# Patient Record
Sex: FEMALE | URBAN - NONMETROPOLITAN AREA
[De-identification: names, ages, dates, MRNs, and addresses within clinical notes are randomized per-mention and may not be internally consistent; named-entity substitution may affect disease eponyms.]

---

## 2021-03-18 ENCOUNTER — OFFICE VISIT (OUTPATIENT)
Dept: URBAN - NONMETROPOLITAN AREA CLINIC 5 | Facility: CLINIC | Age: 71
End: 2021-03-18
Payer: MEDICARE

## 2021-03-18 DIAGNOSIS — H35.373 PUCKERING OF MACULA, BILATERAL: Primary | ICD-10-CM

## 2021-03-18 DIAGNOSIS — H43.813 VITREOUS DEGENERATION, BILATERAL: ICD-10-CM

## 2021-03-18 DIAGNOSIS — H25.13 AGE-RELATED NUCLEAR CATARACT, BILATERAL: ICD-10-CM

## 2021-03-18 PROCEDURE — 92134 CPTRZ OPH DX IMG PST SGM RTA: CPT | Performed by: OPHTHALMOLOGY

## 2021-03-18 PROCEDURE — 99204 OFFICE O/P NEW MOD 45 MIN: CPT | Performed by: OPHTHALMOLOGY

## 2021-03-18 ASSESSMENT — INTRAOCULAR PRESSURE
OS: 8
OD: 11

## 2021-03-18 NOTE — IMPRESSION/PLAN
Impression: Puckering of macula, bilateral: H35.373. OCT OU= mild ERM OD, dense ERM OS  / 472 Plan: There is an epiretinal membrane (ERM) that appears significant. We discussed the natural history as well as the risk and benefits of observation versus vitrectomy with membrane peeling. The patient understands that with vitrectomy, the vision can improve, but does not improve fully and sometimes the vision does not improve at all. Also, it can take months to years for the vision to improve. The risks of vitrectomy include but are not limited to infection, retinal tear or detachment, glaucoma, cataract formation in a phakic patient, hemorrhage, loss of eye and blindness, recurrent epiretinal membranes, need for additional surgery, and chronic cystoid macular edema. The patient understands that some important surgical tasks may be performed by a fellow under my direct supervision, and consents to such. The patient elects to obs for now. 

PLAN: 3m OCT OU x ERM OU

## 2021-03-18 NOTE — IMPRESSION/PLAN
Impression: Age-related nuclear cataract, bilateral: H25.13. Plan: Patient aware that vitrectomy will cause increased cataract growth and likely need for CE/IOL.

## 2023-04-06 ENCOUNTER — OFFICE VISIT (OUTPATIENT)
Dept: URBAN - NONMETROPOLITAN AREA CLINIC 5 | Facility: CLINIC | Age: 73
End: 2023-04-06
Payer: MEDICARE

## 2023-04-06 DIAGNOSIS — H43.813 VITREOUS DEGENERATION, BILATERAL: ICD-10-CM

## 2023-04-06 DIAGNOSIS — H35.373 PUCKERING OF MACULA, BILATERAL: Primary | ICD-10-CM

## 2023-04-06 DIAGNOSIS — H25.13 AGE-RELATED NUCLEAR CATARACT, BILATERAL: ICD-10-CM

## 2023-04-06 PROCEDURE — 99204 OFFICE O/P NEW MOD 45 MIN: CPT | Performed by: OPHTHALMOLOGY

## 2023-04-06 PROCEDURE — 92134 CPTRZ OPH DX IMG PST SGM RTA: CPT | Performed by: OPHTHALMOLOGY

## 2023-04-06 ASSESSMENT — INTRAOCULAR PRESSURE
OS: 13
OD: 16

## 2023-04-06 NOTE — IMPRESSION/PLAN
Impression: Puckering of macula, bilateral: H35.373. OCT OU - no IRF/SRF OU, ERM OS>OD  / 474 Plan: OS>OD There is an epiretinal membrane (ERM). We discussed the natural history as well as the risk and benefits of observation versus vitrectomy with membrane peeling. The patient understands that with vitrectomy, the vision can improve, but does not improve fully and sometimes the vision does not improve at all. Also, it can take months to years for the vision to improve. The risks of vitrectomy include but are not limited to infection, retinal tear or detachment, glaucoma, cataract formation in a phakic patient, hemorrhage, loss of eye and blindness, recurrent epiretinal membranes, need for additional surgery, and chronic cystoid macular edema. The patient elects to proceed with vitrectomy surgery. PLAN: PPV/MP/ILM peel/ICG/IVK x ERM OS Patient [place]. NO GAS for surgery. D/w patient the possible need for SO and then staged SO removal in the future. Patient voices understanding.

## 2023-05-18 ENCOUNTER — POST-OPERATIVE VISIT (OUTPATIENT)
Dept: URBAN - NONMETROPOLITAN AREA CLINIC 5 | Facility: CLINIC | Age: 73
End: 2023-05-18
Payer: MEDICARE

## 2023-05-18 DIAGNOSIS — H35.373 PUCKERING OF MACULA, BILATERAL: Primary | ICD-10-CM

## 2023-05-18 PROCEDURE — 99024 POSTOP FOLLOW-UP VISIT: CPT | Performed by: OPHTHALMOLOGY

## 2023-05-18 ASSESSMENT — INTRAOCULAR PRESSURE
OD: 9
OS: 9

## 2023-05-18 NOTE — IMPRESSION/PLAN
Impression: S/P PPV/MP/ILM peel/ICG/IVK x ERM OS - 1 Day. Puckering of macula, bilateral  H35.373. Plan: No s/s of RD/infection VA/IOP acceptable Post-operative instructions and precautions Reviewed. 
Call ASAP with changes
--Continue Ocuflox QID--Continue PF QID

1-2 week POS

## 2023-06-01 ENCOUNTER — POST-OPERATIVE VISIT (OUTPATIENT)
Dept: URBAN - NONMETROPOLITAN AREA CLINIC 5 | Facility: CLINIC | Age: 73
End: 2023-06-01
Payer: MEDICARE

## 2023-06-01 DIAGNOSIS — H35.373 PUCKERING OF MACULA, BILATERAL: Primary | ICD-10-CM

## 2023-06-01 PROCEDURE — 99024 POSTOP FOLLOW-UP VISIT: CPT | Performed by: OPHTHALMOLOGY

## 2023-06-01 ASSESSMENT — INTRAOCULAR PRESSURE
OS: 17
OD: 14

## 2023-06-01 NOTE — IMPRESSION/PLAN
Impression: S/P PPV/MP/ILM peel/ICG/IVK x ERM OS - 15 Days. Puckering of macula, bilateral  H35.373. Plan: No s/s of RD/infection VA/IOP acceptable Post-operative instructions and precautions Reviewed. Gas pos/prec. Call ASAP with changes --Taper Prednisolone acetate 1% TID x 1 wk, BID x 1wk, QD x 1wk, then d/c
--Discontinue Ocuflox 1 month POS/OCT

## 2023-08-03 ENCOUNTER — POST-OPERATIVE VISIT (OUTPATIENT)
Dept: URBAN - NONMETROPOLITAN AREA CLINIC 5 | Facility: CLINIC | Age: 73
End: 2023-08-03
Payer: MEDICARE

## 2023-08-03 DIAGNOSIS — H35.373 PUCKERING OF MACULA, BILATERAL: Primary | ICD-10-CM

## 2023-08-03 PROCEDURE — 99024 POSTOP FOLLOW-UP VISIT: CPT | Performed by: OPHTHALMOLOGY

## 2023-08-03 ASSESSMENT — INTRAOCULAR PRESSURE
OD: 12
OS: 12

## 2023-09-20 ENCOUNTER — OFFICE VISIT (OUTPATIENT)
Dept: URBAN - METROPOLITAN AREA CLINIC 24 | Facility: CLINIC | Age: 73
End: 2023-09-20
Payer: MEDICARE

## 2023-09-20 DIAGNOSIS — H25.13 AGE-RELATED NUCLEAR CATARACT, BILATERAL: Primary | ICD-10-CM

## 2023-09-20 DIAGNOSIS — H25.11 AGE-RELATED NUCLEAR CATARACT, RIGHT EYE: ICD-10-CM

## 2023-09-20 DIAGNOSIS — H35.373 PUCKERING OF MACULA, BILATERAL: ICD-10-CM

## 2023-09-20 DIAGNOSIS — H43.813 VITREOUS DEGENERATION, BILATERAL: ICD-10-CM

## 2023-09-20 PROCEDURE — 92136 OPHTHALMIC BIOMETRY: CPT | Performed by: OPHTHALMOLOGY

## 2023-09-20 PROCEDURE — 99204 OFFICE O/P NEW MOD 45 MIN: CPT | Performed by: OPHTHALMOLOGY

## 2023-09-20 RX ORDER — PREDNISOLONE ACETATE 10 MG/ML
1 % SUSPENSION/ DROPS OPHTHALMIC
Qty: 2.5 | Refills: 2 | Status: ACTIVE
Start: 2023-09-20

## 2023-09-20 RX ORDER — DICLOFENAC SODIUM 1 MG/ML
0.1 % SOLUTION/ DROPS OPHTHALMIC
Qty: 2.5 | Refills: 2 | Status: ACTIVE
Start: 2023-09-20

## 2023-09-20 ASSESSMENT — KERATOMETRY
OS: 45.91
OD: 44.82

## 2023-09-20 ASSESSMENT — VISUAL ACUITY
OD: 20/25
OS: 20/70

## 2023-10-31 ENCOUNTER — SURGERY (OUTPATIENT)
Dept: URBAN - METROPOLITAN AREA SURGERY 12 | Facility: SURGERY | Age: 73
End: 2023-10-31
Payer: MEDICARE

## 2023-11-14 ENCOUNTER — SURGERY (OUTPATIENT)
Dept: URBAN - METROPOLITAN AREA SURGERY 12 | Facility: SURGERY | Age: 73
End: 2023-11-14
Payer: MEDICARE